# Patient Record
Sex: MALE | Race: BLACK OR AFRICAN AMERICAN | Employment: FULL TIME | URBAN - METROPOLITAN AREA
[De-identification: names, ages, dates, MRNs, and addresses within clinical notes are randomized per-mention and may not be internally consistent; named-entity substitution may affect disease eponyms.]

---

## 2021-09-22 ENCOUNTER — HOSPITAL ENCOUNTER (EMERGENCY)
Facility: CLINIC | Age: 41
Discharge: HOME | End: 2021-09-22
Attending: EMERGENCY MEDICINE | Admitting: EMERGENCY MEDICINE
Payer: COMMERCIAL

## 2021-09-22 VITALS
HEIGHT: 66 IN | SYSTOLIC BLOOD PRESSURE: 148 MMHG | HEART RATE: 66 BPM | RESPIRATION RATE: 16 BRPM | OXYGEN SATURATION: 99 % | TEMPERATURE: 97.4 F | WEIGHT: 198 LBS | BODY MASS INDEX: 31.82 KG/M2 | DIASTOLIC BLOOD PRESSURE: 104 MMHG

## 2021-09-22 DIAGNOSIS — M54.42 ACUTE LEFT-SIDED LOW BACK PAIN WITH LEFT-SIDED SCIATICA: ICD-10-CM

## 2021-09-22 PROCEDURE — 99284 EMERGENCY DEPT VISIT MOD MDM: CPT | Performed by: EMERGENCY MEDICINE

## 2021-09-22 RX ORDER — METHYLPREDNISOLONE 4 MG
TABLET, DOSE PACK ORAL
Qty: 21 TABLET | Refills: 0 | Status: SHIPPED | OUTPATIENT
Start: 2021-09-22

## 2021-09-22 RX ORDER — TIZANIDINE 2 MG/1
2-4 TABLET ORAL 3 TIMES DAILY PRN
Qty: 20 TABLET | Refills: 0 | Status: SHIPPED | OUTPATIENT
Start: 2021-09-22

## 2021-09-22 ASSESSMENT — MIFFLIN-ST. JEOR: SCORE: 1740.87

## 2021-09-22 NOTE — ED TRIAGE NOTES
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 Patient presents with concerns for L) lower back pain for the past few weeks. He reports slipping/ stepping off a pallet awkwardly a few weeks ago and had pain but gradually getting worse. He reports pain radiating down L) leg to calf. He missed work yesterday due to pain. Patient works in shipping and packing department. Sarah Rosado RN

## 2021-09-22 NOTE — DISCHARGE INSTRUCTIONS
As discussed I think most likely you have bulging disc pressing on the nerve causing the pain going on your leg.  I have referred you to spine surgery and they should call you with an appointment.  I also put a referral in for physical therapy.  You should not do any heavy lifting and rest until this is improved.  I hope you get better quickly.

## 2021-09-22 NOTE — LETTER
September 22, 2021      To Whom It May Concern:      ABDIAS Jon was seen in our Emergency Department today, 09/22/21 for an injury incurred at work about 3 weeks ago.  I expect his condition to improve over the next 14 days.  He may return to work when improved but must be on light duty until cleared by his doctor.     Sincerely,        Bereket Goetz MD

## 2021-09-22 NOTE — ED PROVIDER NOTES
"  History     Chief Complaint   Patient presents with     Work Comp     Back Injury     HPI  ABDIAS Jon is a 40 year old male who  presents the emergency department secondary to left low back pain.  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  The pain radiates along the side of the left leg and towards the lateral aspect of the left foot.  This is provoked by straight leg raise and bending at the waist or heavy lifting.  He has not had any numbness or tingling.  No incontinence of urine.  The pain is moderate to severe.  He has been working but any sort of bending or lifting seems to provoke the symptoms.  He has not been seen for this yet.  He does try Tylenol and ibuprofen with minimal relief.    Allergies:  No Known Allergies    Problem List:    There are no problems to display for this patient.       Past Medical History:    No past medical history on file.    Past Surgical History:    No past surgical history on file.    Family History:    No family history on file.    Social History:  Marital Status:   [2]  Social History     Tobacco Use     Smoking status: Not on file   Substance Use Topics     Alcohol use: Not on file     Drug use: Not on file        Medications:    insulin glargine (LANTUS SOLOSTAR) 100 UNIT/ML pen  methylPREDNISolone (MEDROL DOSEPAK) 4 MG tablet therapy pack  tiZANidine (ZANAFLEX) 2 MG tablet          Review of Systems   All other systems reviewed and are negative.      Physical Exam   BP: (!) 149/104 (R) lower forearm)  Pulse: 76  Temp: 97.4  F (36.3  C)  Resp: 16  Height: 167.6 cm (5' 6\")  Weight: 89.8 kg (198 lb)  SpO2: 98 %      Physical Exam  Vitals and nursing note reviewed.   Constitutional:       General: He is not in acute distress.     Appearance: He is well-developed. He is not diaphoretic. "   HENT:      Head: Normocephalic and atraumatic.      Nose: Nose normal.   Eyes:      General: No scleral icterus.  Cardiovascular:      Rate and Rhythm: Normal rate.   Pulmonary:      Effort: Pulmonary effort is normal.   Musculoskeletal:      Cervical back: Normal range of motion and neck supple.      Comments: Straight leg raise to 70 degrees on the left provokes radicular symptoms down the lateral aspect of the left leg.  Straight leg raise on the right does not provoke any symptoms.   Skin:     General: Skin is warm and dry.      Findings: No rash.   Neurological:      Mental Status: He is alert and oriented to person, place, and time.      Cranial Nerves: No cranial nerve deficit.      Motor: No weakness.   Psychiatric:         Mood and Affect: Mood normal.         ED Course        Procedures                  No results found for this or any previous visit (from the past 24 hour(s)).    Medications - No data to display    Assessments & Plan (with Medical Decision Making)  Lumbar radiculopathy suspected.  The patient was referred to spine surgery.  He likely will need an MRI.  He would prefer not to have any procedures done if he can help with.  He was referred to physical therapy.  He is given a Medrol Dosepak and tizanidine.  He declined narcotics.  Risks and follow-up discussed with a competent patient who agrees with the plan.     I have reviewed the nursing notes.    I have reviewed the findings, diagnosis, plan and need for follow up with the patient.      New Prescriptions    METHYLPREDNISOLONE (MEDROL DOSEPAK) 4 MG TABLET THERAPY PACK    Follow Package Directions    TIZANIDINE (ZANAFLEX) 2 MG TABLET    Take 1-2 tablets (2-4 mg) by mouth 3 times daily as needed for muscle spasms       Final diagnoses:   Acute left-sided low back pain with left-sided sciatica       9/22/2021   [facility] EMERGENCY DEPT     Bereket Goetz MD  09/22/21 [id number]

## 2021-09-23 ENCOUNTER — HOSPITAL ENCOUNTER (OUTPATIENT)
Dept: GENERAL RADIOLOGY | Facility: CLINIC | Age: 41
Discharge: HOME | End: 2021-09-23
Attending: NURSE PRACTITIONER | Admitting: NURSE PRACTITIONER
Payer: COMMERCIAL

## 2021-09-23 ENCOUNTER — OFFICE VISIT (OUTPATIENT)
Dept: NEUROSURGERY | Facility: CLINIC | Age: 41
End: 2021-09-23
Attending: EMERGENCY MEDICINE
Payer: COMMERCIAL

## 2021-09-23 VITALS
BODY MASS INDEX: 30.97 KG/M2 | DIASTOLIC BLOOD PRESSURE: 82 MMHG | HEIGHT: 66 IN | TEMPERATURE: 97.7 F | WEIGHT: 192.7 LBS | SYSTOLIC BLOOD PRESSURE: 128 MMHG

## 2021-09-23 DIAGNOSIS — M54.42 ACUTE LEFT-SIDED LOW BACK PAIN WITH LEFT-SIDED SCIATICA: ICD-10-CM

## 2021-09-23 PROCEDURE — 99203 OFFICE O/P NEW LOW 30 MIN: CPT | Performed by: NURSE PRACTITIONER

## 2021-09-23 PROCEDURE — 72100 X-RAY EXAM L-S SPINE 2/3 VWS: CPT

## 2021-09-23 ASSESSMENT — MIFFLIN-ST. JEOR: SCORE: 1716.83

## 2021-09-23 NOTE — PROGRESS NOTES
"ABDIAS Jon is a 40 year old male who presents for:  Chief Complaint   Patient presents with     Neurologic Problem     Acute Left LBP with Left sciatica        Initial Vitals:  /82   Temp 97.7  F (36.5  C) (Temporal)   Ht 5' 6\" (1.676 m)   Wt 192 lb 11.2 oz (87.4 kg)   BMI 31.10 kg/m   Estimated body mass index is 31.1 kg/m  as calculated from the following:    Height as of this encounter: 5' 6\" (1.676 m).    Weight as of this encounter: 192 lb 11.2 oz (87.4 kg).. Body surface area is 2.02 meters squared. BP completed using cuff size: regular  Data Unavailable    Nursing Comments:     Ny Madsen    "

## 2021-09-23 NOTE — PATIENT INSTRUCTIONS
-Lumbar xray today. Please complete. I will contact you if the results are abnormal.  -Physical therapy ordered. They will contact you to schedule.  -Please contact our clinic with questions or concerns at [phone].

## 2021-09-23 NOTE — LETTER
"    9/23/2021         RE: ABDIAS Jon  [address]  [facility] [address]        Dear Colleague,    Thank you for referring your patient, ABDIAS Jon, to the [facility] NEUROSURGERY [facility]INIC [place]. Please see a copy of my visit note below.    ABDIAS Jon is a 40 year old male who presents for:  Chief Complaint   Patient presents with     Neurologic Problem     Acute Left LBP with Left sciatica        Initial Vitals:  /82   Temp 97.7  F (36.5  C) (Temporal)   Ht 5' 6\" (1.676 m)   Wt 192 lb 11.2 oz (87.4 kg)   BMI 31.10 kg/m   Estimated body mass index is 31.1 kg/m  as calculated from the following:    Height as of this encounter: 5' 6\" (1.676 m).    Weight as of this encounter: 192 lb 11.2 oz (87.4 kg).. Body surface area is 2.02 meters squared. BP completed using cuff size: regular  Data Unavailable    Nursing Comments:     Ny Madsen      [facility] Neurosurgery  Neurosurgery Clinic Visit      CC: low back and leg pain    Primary care Provider: Clinic, [facility]    Reason For Visit:   I was asked by Dr. Goetz to consult on the patient for acute left-sided low back pain with left-sided sciatica.    HPI: ABDIAS Jon is a 40 year old male with a 3.5 week history of low back and left leg pain who presents for worsening symptoms. He states he stepped off a ledge and landed incorrectly while at work. He immediately had low back pain which progressed to left-sided low back pain that would radiate to the left posterior thigh to the calf. He reports intermittent numbness in the bilateral hips as well.  He denies overt weakness. No changes to bowel or bladder. He states symptoms are worse with inactivity. He states he is not interested in invasive procedures at this time. He states he has had benefit from PT for his shoulder in the past and would like to start therapy.    No past medical history on file.    Past Medical History reviewed with patient during visit.    No past surgical " "history on file.  Past Surgical History reviewed with patient during visit.    Current Outpatient Medications   Medication     insulin glargine (LANTUS SOLOSTAR) 100 UNIT/ML pen     methylPREDNISolone (MEDROL DOSEPAK) 4 MG tablet therapy pack     tiZANidine (ZANAFLEX) 2 MG tablet     No current facility-administered medications for this visit.       No Known Allergies    Social History     Socioeconomic History     Marital status:      Spouse name: None     Number of children: None     Years of education: None     Highest education level: None   Occupational History     None   Tobacco Use     Smoking status: Never Smoker     Smokeless tobacco: Never Used   Substance and Sexual Activity     Alcohol use: None     Drug use: None     Sexual activity: None   Other Topics Concern     None   Social History Narrative     None     Social Determinants of Health     Financial Resource Strain:      Difficulty of Paying Living Expenses:    Food Insecurity:      Worried About Running Out of Food in the Last Year:      Ran Out of Food in the Last Year:    Transportation Needs:      Lack of Transportation (Medical):      Lack of Transportation (Non-Medical):    Physical Activity:      Days of Exercise per Week:      Minutes of Exercise per Session:    Stress:      Feeling of Stress :    Social Connections:      Frequency of Communication with Friends and Family:      Frequency of Social Gatherings with Friends and Family:      Attends Nondenominational Services:      Active Member of Clubs or Organizations:      Attends Club or Organization Meetings:      Marital Status:    Intimate Partner Violence:      Fear of Current or Ex-Partner:      Emotionally Abused:      Physically Abused:      Sexually Abused:        No family history on file.      ROS: 10 point ROS neg other than the symptoms noted above in the HPI.    Vital Signs:   /82   Temp 97.7  F (36.5  C) (Temporal)   Ht 5' 6\" (1.676 m)   Wt 192 lb 11.2 oz (87.4 kg)   " BMI 31.10 kg/m        Examination:  Constitutional:  Alert, well nourished, NAD.  Memory: recent and remote memory   HEENT: Normocephalic, atraumatic.   Pulm:  Without shortness of breath   CV:  No pitting edema of BLE.      Neurological:  Awake  Alert  Oriented x 3  Speech clear  Tongue midline    Motor exam:  Hip Flexor:                Right: 5/5  Left:  5/5  Hip Adductor:             Right:  5/5  Left:  5/5  Hip Abductor:             Right:  5/5  Left:  5/5  Gastroc Soleus:        Right:  5/5  Left:  5/5  Tib/Ant:                      Right:  5/5  Left:  5/5  EHL:                          Right:  5/5  Left:  5/5     Sensation normal to bilateral upper and lower extremities  Muscle tone to bilateral upper and lower extremities   Gait: Able to stand from a seated position. Normal non-antalgic, non-myelopathic gait.  Able to heel/toe walk without loss of balance    Lumbar examination reveals no tenderness of the spine or paraspinous muscles.  Hip height is symmetrical. Negative SI joint, sciatic notch or greater trochanteric tenderness to palpation bilaterally.  Straight leg raise is negative bilaterally.      Imaging: None    Assessment/Plan:   Acute low back pain with left-sided sciatica. Discussed options of XR/PT and MRI. He would like to start with XR/PT at this time as he is not interested in surgery and/or injections at this time. Red flag symptoms discussed. He will obtain the XR today. I will contact him if the results are abnormal. He will contact the clinic with new or worsening symptoms. He verbalized understanding and agreement.    Patient Instructions   -Lumbar xray today. Please complete. I will contact you if the results are abnormal.  -Physical therapy ordered. They will contact you to schedule.  -Please contact our clinic with questions or concerns at [phone].      Daysi Lanza, [facility] Neurosurgery  [address] [place] [address]  [address] [place] [address]  Tel  [phone]  Fax [phone]        Again, thank you for allowing me to participate in the care of your patient.        Sincerely,        Daysi Lanza NP

## 2021-09-23 NOTE — PROGRESS NOTES
[facility] Neurosurgery  Neurosurgery Clinic Visit      CC: low back and leg pain    Primary care Provider: Clinic, [facility]    Reason For Visit:   I was asked by Dr. Goetz to consult on the patient for acute left-sided low back pain with left-sided sciatica.    HPI: ABDIAS Jon is a 40 year old male with a 3.5 week history of low back and left leg pain who presents for worsening symptoms. He states he stepped off a ledge and landed incorrectly while at work. He immediately had low back pain which progressed to left-sided low back pain that would radiate to the left posterior thigh to the calf. He reports intermittent numbness in the bilateral hips as well.  He denies overt weakness. No changes to bowel or bladder. He states symptoms are worse with inactivity. He states he is not interested in invasive procedures at this time. He states he has had benefit from PT for his shoulder in the past and would like to start therapy.    No past medical history on file.    Past Medical History reviewed with patient during visit.    No past surgical history on file.  Past Surgical History reviewed with patient during visit.    Current Outpatient Medications   Medication     insulin glargine (LANTUS SOLOSTAR) 100 UNIT/ML pen     methylPREDNISolone (MEDROL DOSEPAK) 4 MG tablet therapy pack     tiZANidine (ZANAFLEX) 2 MG tablet     No current facility-administered medications for this visit.       No Known Allergies    Social History     Socioeconomic History     Marital status:      Spouse name: None     Number of children: None     Years of education: None     Highest education level: None   Occupational History     None   Tobacco Use     Smoking status: Never Smoker     Smokeless tobacco: Never Used   Substance and Sexual Activity     Alcohol use: None     Drug use: None     Sexual activity: None   Other Topics Concern     None   Social History Narrative     None     Social Determinants of Health  "    Financial Resource Strain:      Difficulty of Paying Living Expenses:    Food Insecurity:      Worried About Running Out of Food in the Last Year:      Ran Out of Food in the Last Year:    Transportation Needs:      Lack of Transportation (Medical):      Lack of Transportation (Non-Medical):    Physical Activity:      Days of Exercise per Week:      Minutes of Exercise per Session:    Stress:      Feeling of Stress :    Social Connections:      Frequency of Communication with Friends and Family:      Frequency of Social Gatherings with Friends and Family:      Attends Pentec[employer]al Services:      Active Member of Clubs or Organizations:      Attends Club or Organization Meetings:      Marital Status:    Intimate Partner Violence:      Fear of Current or Ex-Partner:      Emotionally Abused:      Physically Abused:      Sexually Abused:        No family history on file.      ROS: 10 point ROS neg other than the symptoms noted above in the HPI.    Vital Signs:   /82   Temp 97.7  F (36.5  C) (Temporal)   Ht 5' 6\" (1.676 m)   Wt 192 lb 11.2 oz (87.4 kg)   BMI 31.10 kg/m        Examination:  Constitutional:  Alert, well nourished, NAD.  Memory: recent and remote memory   HEENT: Normocephalic, atraumatic.   Pulm:  Without shortness of breath   CV:  No pitting edema of BLE.      Neurological:  Awake  Alert  Oriented x 3  Speech clear  Tongue midline    Motor exam:  Hip Flexor:                Right: 5/5  Left:  5/5  Hip Adductor:             Right:  5/5  Left:  5/5  Hip Abductor:             Right:  5/5  Left:  5/5  Gastroc Soleus:        Right:  5/5  Left:  5/5  Tib/Ant:                      Right:  5/5  Left:  5/5  EHL:                          Right:  5/5  Left:  5/5     Sensation normal to bilateral upper and lower extremities  Muscle tone to bilateral upper and lower extremities   Gait: Able to stand from a seated position. Normal non-antalgic, non-myelopathic gait.  Able to heel/toe walk without loss of " balance    Lumbar examination reveals no tenderness of the spine or paraspinous muscles.  Hip height is symmetrical. Negative SI joint, sciatic notch or greater trochanteric tenderness to palpation bilaterally.  Straight leg raise is negative bilaterally.      Imaging: None    Assessment/Plan:   Acute low back pain with left-sided sciatica. Discussed options of XR/PT and MRI. He would like to start with XR/PT at this time as he is not interested in surgery and/or injections at this time. Red flag symptoms discussed. He will obtain the XR today. I will contact him if the results are abnormal. He will contact the clinic with new or worsening symptoms. He verbalized understanding and agreement.    Patient Instructions   -Lumbar xray today. Please complete. I will contact you if the results are abnormal.  -Physical therapy ordered. They will contact you to schedule.  -Please contact our clinic with questions or concerns at [phone].      Daysi Lanza, [facility] Neurosurgery  [address] [place] [id number]  Tel [phone]  Fax [phone]

## 2021-09-29 ENCOUNTER — HOSPITAL ENCOUNTER (OUTPATIENT)
Dept: PHYSICAL THERAPY | Facility: CLINIC | Age: 41
Setting detail: THERAPIES SERIES
End: 2021-09-29
Attending: NURSE PRACTITIONER
Payer: COMMERCIAL

## 2021-09-29 DIAGNOSIS — M54.42 ACUTE LEFT-SIDED LOW BACK PAIN WITH LEFT-SIDED SCIATICA: ICD-10-CM

## 2021-09-29 PROCEDURE — 97014 ELECTRIC STIMULATION THERAPY: CPT | Mod: GP | Performed by: PHYSICAL THERAPIST

## 2021-09-29 PROCEDURE — 97162 PT EVAL MOD COMPLEX 30 MIN: CPT | Mod: GP | Performed by: PHYSICAL THERAPIST

## 2021-09-29 PROCEDURE — 97110 THERAPEUTIC EXERCISES: CPT | Mod: GP | Performed by: PHYSICAL THERAPIST

## 2021-09-30 NOTE — PROGRESS NOTES
[facility]      OUTPATIENT PHYSICAL THERAPY ORTHOPEDIC EVALUATION  PLAN OF TREATMENT FOR OUTPATIENT REHABILITATION  (COMPLETE FOR INITIAL CLAIMS ONLY)  Patient's Last Name, First Name, ABDIAS Rutledge    Provider s Name:  [facility]   Medical Record No.  [id number]   Start of Care Date:  09/29/21   Onset Date:  09/22/21 (Sought care in ER, incident apprx 4 weeks ago)   Type:     _X__PT   ___OT   ___SLP Medical Diagnosis:  LBP with radiculopathy     PT Diagnosis:  Acute LBP with radiculopathy, segmental joint dysfunction, reduced functional mobility   Visits from SOC:  1   Therapist assessment:   _________________________________________________________________________________  Plan of Treatment/Functional Goals:  joint mobilization, ROM, strengthening, stretching, neuromuscular re-education, manual therapy           Goals  Goal Identifier: FREDDY  Goal Description: Patient will reduce back related disabilty from 38% to <10% to return to work without restrictions in 8 weeks  Target Date: 11/24/21    Goal Identifier: Pain   Goal Description: Patient will note no radicular LE symptoms in L LE x 1 week to demonstrate centralzation for disc healing.  Target Date: 10/27/21    Goal Identifier: Activity tolerance  Goal Description: Patient will report ability to stand, reposition, cook light meal for 15-20 minutes, walk at grocery store with pain < 3/10 and no leg pain  Target Date: 11/17/21            Therapy Frequency:  2 times/Week  Predicted Duration of Therapy Intervention:  8 weeks    Laverne Owens, PT                 I CERTIFY THE NEED FOR THESE SERVICES FURNISHED UNDER        THIS PLAN OF TREATMENT AND WHILE UNDER MY CARE     (Physician co-signature of this document indicates review and certification of the therapy plan).                        Certification Date From:  09/29/21   Certification Date To:  12/01/21    Referring Provider:  Daysi Chase  Assessment        See Epic Evaluation Start of Care Date: 09/29/21             Laverne Marie................... PT, DPT, CLT   9/29/2021, 7:35 PM  [phone]

## 2021-09-30 NOTE — PROGRESS NOTES
09/29/21 1700   General Information   Type of Visit Initial OP Ortho PT Evaluation   Start of Care Date 09/29/21   Referring Physician Daysi Lanza   Patient/Family Goals Statement Get better   Orders Per Therapist Evaluation   Date of Order 09/23/21   Certification Required? Yes   Medical Diagnosis Acute left-sided low back pain with left-sided sciatica M54.42    Precautions/Limitations other (see comments)   Weight-Bearing Status - LUE weight-bearing as tolerated   Weight-Bearing Status - RUE weight-bearing as tolerated   Weight-Bearing Status - LLE weight-bearing as tolerated   Weight-Bearing Status - RLE weight-bearing as tolerated   General Information Comments Just got work restritions today for no lifting > 10#, no    Body Part(s)   Body Part(s) Lumbar Spine/SI   Presentation and Etiology   Pertinent history of current problem (include personal factors and/or comorbidities that impact the POC) Chief complaint: L low back and leg pain that began  A few week ago at work. .  He states he stepped off a ledge and landed incorrectly while at work. He landed on both feet but felt twisted; about 3 feet off the platform. Stated immediate strain; He immediately had low back pain which progressed to left-sided low back pain that would radiate to the left posterior thigh to the calf. He sought cares in ER on 9/22 when he missed work d/t pain. He didn't seek care when it happened as he was just trying to work it out and that it would get better on its own. It has not. He has no history of low back pain. Has remained unchanged since that day; S.O reports he is moving more slowing and guardedly over time; so she things things are worsening. . Sitting is better than walking but all better than standing in one place.  He reports intermittent numbness in the bilateral hips as well.  He denies overt weakness. No changes to bowel or bladder. He states symptoms are worse with inactivity. States quick movements, or  "reaching.  Patient is self reporting their current level of function to be 10% compared to their baseline; limited.  Prior interventions include none.  States staying still helps. Used to be a stomach sleeper but cannot lay prone.  Patient takes Tylenol and Aleve medication for this on a PRN basis, Got steroids and mm relaxers and they still .  Patient is employed at [employer] full time, currently on a 10# weight restriction and no lifting/twisting so is currently off work.  Work comp claim has been opened. Pain is 5/10 at best, 10/10 worst, and 8/10 currently; size of melon on L low back, no radicular symptoms currently. Usually shoots down with movement of any sort.  Can radiate to backside of calf. No formal exercise program at baseline. Goal for PT: Alleviate back pain and back to work; \"be better\".    Impairments B. Decreased WB tolerance;D. Decreased ROM;E. Decreased flexibility;F. Decreased strength and endurance;A. Pain   Functional Limitations perform activities of daily living;perform desired leisure / sports activities;perform required work activities   Symptom Location L low back, radicular symptoms intermittent to L distal calf   How/Where did it occur With a fall;At work   Onset date of current episode/exacerbation 09/22/21  (Sought care in ER, incident apprx 4 weeks ago)   Chronicity New   Pain quality A. Sharp;B. Dull;C. Aching   Progression of symptoms since onset: Worsened   Current / Previous Interventions   Diagnostic Tests: X-ray   X-ray Results Results   X-ray results Slight disc height reduction L1, no fractures noted   Prior Level of Function   Prior Level of Function-Mobility Independent   Functional Level Prior Comment Independent, no hx of back pain, works at [employer]   Current Level of Function   Patient role/employment history A. Employed   Living environment House/townhome   Home/community accessibility Has 1 and 2 year old, S.O appears to be pregnant currently    Current " equipment-Gait/Locomotion None   Fall Risk Screen   Fall screen completed by PT   Have you fallen 2 or more times in the past year? No   Have you fallen and had an injury in the past year? Yes   Is patient a fall risk? No   Fall screen comments Patient came down off an elevated surface at work awkwardly, no obvious weakness in LEs but ROM in LEs limited by pain in back.    Abuse Screen (yes response referral indicated)   Feels Unsafe at Home or Work/School no   Feels Threatened by Someone no   Does Anyone Try to Keep You From Having Contact with Others or Doing Things Outside Your Home? no   Physical Signs of Abuse Present no   System Outcome Measures   Outcome Measures Low Back Pain (see Oswestry and Fran)   Functional Scales   Functional Scales Other   Other Scales  Fran 6 sub 3, FREDDY 28%   Lumbar Spine/SI Objective Findings   Observation Patient appears in pain, slow transitional movements , somewhat guarded movement patterns   Integumentary No concerns   Posture Lays with L leg less IR than resting R ER at natural position   Gait/Locomotion Patient is unable to stand fully erect and places L leg ahead of R in stance, and with reduced stride length L>R. slightly slowed gait speed   Balance/Proprioception (Single Leg Stance) NT   Flexion ROM % limited   Extension ROM Limited to just about neutral, pain worse   Right Side Bending ROM Max LTM   Left Side Bending ROM 75% limited   Lumbar ROM Comment Repeated movements: Stating pain in standing 6/10 size of melon in L low back. Standing extension increases distal symptom to IT region 8/10 pain, Flexion also very limited and can flex to fingertips at mid thigh, Increased L distal symptom to Superior upper thigh posteriorly. No tolerance to R SB max limited and increased L sided LBP and radiating to superior glute but more dull pain. Prone 8/10 low back pain and cannot extend well tends to ER and keep L hip flexed. Hookyling 7.5/10 L LBP. Cannot tolerate SKC >90  deg and L SKC > 60 deg. Even hooklie is hard for him. Prone over 2 pillows reduces pain to 5/10 in L low back size of grapefruit * best position. Prone over 1 increases size and to 7./10.    Transversus Abdominus Strength (Enoch Leg Lowering-deg) Fair ab set awareness but holds breath   Hip Flexion (L2) Strength cannot test, too much pain with ROM flexion of either hip   Lumbar/Hip/Knee/Foot Strength Comments NO obvious strength asymmetry, strength >3+/5 in major mm groups observed through movement   Hamstring Flexibility LImited d/t pain   Hip Flexor Flexibility Limited R>L d/t pain, tender at palpation iliacus and iliopsoas L>R   Piriformis Flexibility Tightness L>R via palpation. Poor tolerance to hip ROM > 60 flexion and small range ER/IR.    Lumbar/SI Special Tests Comments Testing limited by pain with ROM testing   Segmental Mobility NT   Sensation Testing No parathesias reported, LE pain reported   Palpation Appears to have mid lumbar segmental L rotations. Paraspinals more in spasm on L side to TL junction. L glutes and piriformis more taut than R side. + increased pain on L with R, L SI shear test and L is more hypomobile   Planned Therapy Interventions   Planned Therapy Interventions joint mobilization;ROM;strengthening;stretching;neuromuscular re-education;manual therapy   Planned Modality Interventions   Planned Modality Interventions Electrical stimulation;Cryotherapy;Ultrasound   Clinical Impression   Criteria for Skilled Therapeutic Interventions Met yes, treatment indicated   PT Diagnosis Acute LBP with radiculopathy, segmental joint dysfunction, reduced functional mobility   Influenced by the following impairments ROM,palpation, Force analysis ,    Functional limitations due to impairments Most anything active , work, home tasks,  and housework tasks   Clinical Presentation Evolving/Changing   Clinical Presentation Rationale PLOF vs current, radicular past knee, motivated to improve and  engaged to participate in PT   Clinical Decision Making (Complexity) Moderate complexity   Therapy Frequency 2 times/Week   Predicted Duration of Therapy Intervention (days/wks) 8 weeks   Risk & Benefits of therapy have been explained Yes   Patient, Family & other staff in agreement with plan of care Yes   Clinical Impression Comments Appears to have acute onset LBP with radicular symptoms which seem disogenic. Prone over 2 pillows demos centralization with quite quick return to increased pain upon rising. MOdalities , education ,positional strategies as first course of care followed by progression as able. Patient has already been seen in spine clinic and by PCP today who provided work restrictions. Open but not approved WC case pending.    Education Assessment   Preferred Learning Style Listening;Reading;Demonstration;Pictures/video   Barriers to Learning No barriers   ORTHO GOALS   PT Ortho Eval Goals 1;2;3;4   Ortho Goal 1   Goal Identifier FREDDY   Goal Description Patient will reduce back related disabilty from 38% to <10% to return to work without restrictions in 8 weeks   Target Date 11/24/21   Ortho Goal 2   Goal Identifier Pain    Goal Description Patient will note no radicular LE symptoms in L LE x 1 week to demonstrate centralzation for disc healing.   Target Date 10/27/21   Ortho Goal 3   Goal Identifier Activity tolerance   Goal Description Patient will report ability to stand, reposition, cook light meal for 15-20 minutes, walk at grocery store with pain < 3/10 and no leg pain   Target Date 11/17/21   Total Evaluation Time   PT Trae Moderate Complexity Minutes (39030) 20   Therapy Certification   Certification date from 09/29/21   Certification date to 12/01/21   Medical Diagnosis LBP with radiculopathy

## 2021-10-04 ENCOUNTER — HOSPITAL ENCOUNTER (OUTPATIENT)
Dept: PHYSICAL THERAPY | Facility: CLINIC | Age: 41
Setting detail: THERAPIES SERIES
End: 2021-10-04
Attending: NURSE PRACTITIONER
Payer: OTHER MISCELLANEOUS

## 2021-10-04 PROCEDURE — 97014 ELECTRIC STIMULATION THERAPY: CPT | Mod: GP | Performed by: PHYSICAL THERAPIST

## 2021-10-04 PROCEDURE — 97530 THERAPEUTIC ACTIVITIES: CPT | Mod: GP | Performed by: PHYSICAL THERAPIST

## 2021-10-04 PROCEDURE — 97110 THERAPEUTIC EXERCISES: CPT | Mod: GP | Performed by: PHYSICAL THERAPIST

## 2021-10-04 PROCEDURE — 97140 MANUAL THERAPY 1/> REGIONS: CPT | Mod: GP | Performed by: PHYSICAL THERAPIST

## 2021-10-06 ENCOUNTER — IMMUNIZATION (OUTPATIENT)
Dept: FAMILY MEDICINE | Facility: CLINIC | Age: 41
End: 2021-10-06
Payer: COMMERCIAL

## 2021-10-06 DIAGNOSIS — Z23 NEED FOR PROPHYLACTIC VACCINATION AND INOCULATION AGAINST INFLUENZA: Primary | ICD-10-CM

## 2021-10-06 PROCEDURE — 99207 PR NO CHARGE NURSE ONLY: CPT

## 2021-10-06 PROCEDURE — 90471 IMMUNIZATION ADMIN: CPT

## 2021-10-06 PROCEDURE — 90686 IIV4 VACC NO PRSV 0.5 ML IM: CPT

## 2021-10-07 ENCOUNTER — HOSPITAL ENCOUNTER (OUTPATIENT)
Dept: PHYSICAL THERAPY | Facility: CLINIC | Age: 41
Setting detail: THERAPIES SERIES
End: 2021-10-07
Attending: NURSE PRACTITIONER
Payer: OTHER MISCELLANEOUS

## 2021-10-07 PROCEDURE — 97110 THERAPEUTIC EXERCISES: CPT | Mod: GP

## 2021-10-07 PROCEDURE — 97014 ELECTRIC STIMULATION THERAPY: CPT | Mod: GP

## 2021-10-07 PROCEDURE — 97010 HOT OR COLD PACKS THERAPY: CPT | Mod: GP

## 2021-10-07 PROCEDURE — 97140 MANUAL THERAPY 1/> REGIONS: CPT | Mod: GP

## 2021-10-12 ENCOUNTER — HOSPITAL ENCOUNTER (OUTPATIENT)
Dept: PHYSICAL THERAPY | Facility: CLINIC | Age: 41
Setting detail: THERAPIES SERIES
End: 2021-10-12
Attending: NURSE PRACTITIONER
Payer: OTHER MISCELLANEOUS

## 2021-10-12 PROCEDURE — 97010 HOT OR COLD PACKS THERAPY: CPT | Mod: GP

## 2021-10-12 PROCEDURE — 97110 THERAPEUTIC EXERCISES: CPT | Mod: GP

## 2021-10-12 PROCEDURE — 97014 ELECTRIC STIMULATION THERAPY: CPT | Mod: GP

## 2021-10-12 PROCEDURE — 97140 MANUAL THERAPY 1/> REGIONS: CPT | Mod: GP

## 2021-10-15 ENCOUNTER — HOSPITAL ENCOUNTER (OUTPATIENT)
Dept: PHYSICAL THERAPY | Facility: CLINIC | Age: 41
Setting detail: THERAPIES SERIES
End: 2021-10-15
Attending: NURSE PRACTITIONER
Payer: OTHER MISCELLANEOUS

## 2021-10-15 PROCEDURE — 97014 ELECTRIC STIMULATION THERAPY: CPT | Mod: GP

## 2021-10-15 PROCEDURE — 97110 THERAPEUTIC EXERCISES: CPT | Mod: GP

## 2021-10-15 PROCEDURE — 97010 HOT OR COLD PACKS THERAPY: CPT | Mod: GP

## 2021-10-17 ENCOUNTER — HEALTH MAINTENANCE LETTER (OUTPATIENT)
Age: 41
End: 2021-10-17

## 2021-10-18 ENCOUNTER — HOSPITAL ENCOUNTER (OUTPATIENT)
Dept: PHYSICAL THERAPY | Facility: CLINIC | Age: 41
Setting detail: THERAPIES SERIES
End: 2021-10-18
Attending: NURSE PRACTITIONER
Payer: OTHER MISCELLANEOUS

## 2021-10-18 PROCEDURE — 97110 THERAPEUTIC EXERCISES: CPT | Mod: GP | Performed by: PHYSICAL THERAPIST

## 2021-10-18 PROCEDURE — 97140 MANUAL THERAPY 1/> REGIONS: CPT | Mod: GP | Performed by: PHYSICAL THERAPIST

## 2021-10-18 PROCEDURE — 97014 ELECTRIC STIMULATION THERAPY: CPT | Mod: GP | Performed by: PHYSICAL THERAPIST

## 2021-10-18 NOTE — PROGRESS NOTES
Outpatient Physical Therapy Progress Note     Patient: ABDIAS Jon  : 1980    Beginning/End Dates of Reporting Period:  2021 to 10/18/2021    Referring Provider: Daysi Lanza (Spine clinic), Dr Cabrales PCP    Therapy Diagnosis: Low back pain, lumbar radiculopathy; reduced ROM, joint mobility, activity tolerance    Client Self Report: MD visit on Wednesday 10/20; work wants updated work restrictions. They do not have light duty for him at this point still. NO leg pain x 1.5 weeks. Back pain is starting to less intense all way; some times of very low levels of pain but flared up with activities still like High stepping over gate or prolonged standing; can sleep better overall.  60% better overall since onset of symptoms, but not yet tolerating higher level activities. He remains compliant with PT recommendations and has limited his lifting and bending as I suggested    Objective Measurements:  Objective Measure: PREP  Details: Continues to do 1-2 pillows based on response. No leg pain x 1.5 weeks, localized to his low back now.   Objective Measure: Pain  Details: 4/10, L lumbar size of orange. No L LE pain x 1.5 weeks now  Objective Measure: Palpation  Details: TIghtness paraspinals on L with TL junction L rotated with rib tension T10-12, L rotated T7, Psoas tension L>R .         Goals:  Goal Identifier FREDDY   Goal Description Patient will reduce back related disabilty from 38% to <10% to return to work without restrictions in 8 weeks   Target Date 21   Date Met      Progress (detail required for progress note):  Not completed today but improving overall     Goal Identifier Pain    Goal Description Patient will note no radicular LE symptoms in L LE x 1 week to demonstrate centralzation for disc healing.   Target Date 10/27/21   Date Met  10/18/21   Progress (detail required for progress note):       Goal Identifier Activity tolerance   Goal Description Patient will report ability to  stand, reposition, cook light meal for 15-20 minutes, walk at grocery store with pain < 3/10 and no leg pain   Target Date 11/17/21   Date Met      Progress (detail required for progress note): Improving but pain remains from 1-6 in low back with ADLs and currently not working       Patient has attended 6 visits in this reporting period. Treatment has included therapeutic exercise for Lumbar positioning/centralization, ROM, Manual therapy to address tightness and reduced joint mobility affecting posture and movement tolerance, modalities to reduce inflammation and pain, and patient education. Still limited trunk and L hip extension but improving in postural elongation and eliminated his leg pain for 1.5 weeks. Still recommend patient avoid bending, lifting, twisting as much as possible, limited carrying and close to body; limited prolonged standing, able to reposition into his prone lying position every 2 hours. Will meet with MD this week for updated work restrictions,and light duty remains recommended by me in light of his current limitations though improving. Patient reports  progress overall in symptoms reduction and is making progress towards goals as outlined above  Plan:  Continue therapy per current plan of care.  Changes include     Discharge:  N         10/18/21 1000   Signing Clinician's Name / Credentials   Signing clinician's name / credentials Laverne Owens, PT, DPT   Session Number   Session Number 6   Authorization status Request for 12 visits submitted to  insurance basket.    Progress Note/Recertification   Progress Note Due Date 11/29/21   Progress Note Completed Date 10/18/21   Adult Goals   PT Ortho Eval Goals 1;2;3;4   Ortho Goal 1   Goal Identifier FREDDY   Goal Description Patient will reduce back related disabilty from 38% to <10% to return to work without restrictions in 8 weeks   Target Date 11/24/21   Ortho Goal 2   Goal Identifier Pain    Goal Description Patient will note no radicular LE  symptoms in L LE x 1 week to demonstrate centralzation for disc healing.   Target Date 10/27/21   Date Met 10/18/21   Ortho Goal 3   Goal Identifier Activity tolerance   Goal Description Patient will report ability to stand, reposition, cook light meal for 15-20 minutes, walk at grocery store with pain < 3/10 and no leg pain   Goal Progress Improving but pain remains from 1-6 in low back with ADLs and currently not working   Target Date 11/17/21   Subjective Report   Subjective Report MD visit on Wednesday 10/20; work wants updated work restrictions. They do not have light duty for him at this point still. NO leg pain x 1.5 weeks. Back pain is starting to less intense all way; some times of very low levels; High stepping over gate; can sleep better overall.  60% better overall since onset of symptoms, but not yet tolerating higher level activities.    Objective Measure 1   Objective Measure PREP   Details Continues to do 1-2 pillows based on response.    Objective Measure 2   Objective Measure Pain   Details 4/10, L lumbar size of orange. No L LE pain x 1.5 weeks now   Objective Measure 3   Objective Measure Palpation   Details TIghtness paraspinals on L with TL junction L rotated with rib tension T10-12, L rotated T7, Psoas tension L>R .   Cryotherapy   Cryotherapy Minutes (64793) 20   Skilled Intervention Cryotherapy for pain mgmt   Patient Response Tolerated well   Treatment Detail Applied cold pack over lumbar spine with IFC   Electrical Stimulation   Electrical Stimulation (Unattended) Minutes (93197) 20   Skilled Intervention Application of ESTM to reduce mm spasms, tissue edema and inflammation to promote progression of function and exercise tolerance,reduced pain   Patient Response Patient tolerates well with    Treatment Detail IFC to lumbar spine. Pt. positioned in prone over 1 pillows.  High, ICF, target to tolerance ~14   Therapeutic Procedure/exercise   Therapeutic Procedures: strength, endurance, ROM,  flexibillity minutes (15951) 10   Skilled Intervention Updated to add in quad/L hip flexor lengthening, continue to focus on lumbar centralization   Patient Response Patietn tolerates PROM quad stretch, AROM causes more back compensation d/t tension so will have his wife help him   Treatment Detail States did ok with  previous ex. WIll continue to work positional HEP to prone over 1 pillow.    Manual Therapy   Manual Therapy: Mobilization, MFR, MLD, friction massage minutes (48713) 25   Skilled Intervention STM/MT to reduce mm tension and improve tolerance to position change, posture and activities   Patient Response L TL junction rotation with rib tension L10-12 and poor opening L 11/12. T5 also L rotated. Elongation of anterior diaphragm region on L helpful to posture   Treatment Detail Prone Tx to tight regions of Thoracic and lumbar spine   Progress Still limited trunk adn L hip extension but improving in postural elongation and eliminated his leg pain for 1.5 weeks. Still recommend patient avoid bending, lifting, twisting as much as possible, limited carrying and close to body; limited prolonged standing, able to reposition into his prone lying position every 2 hours. Will meet with MD this week for updated work restrictions   Communication with other professionals   Communication with other professionals Note sent to Dr Quiroz   Plan   Home program PREP, L quad stretch, body mechanics   Plan for next session ESTM, MT, progress exercise if able and not worsening symptoms   Total Session Time   Timed Code Treatment Minutes 55   Total Treatment Time (sum of timed and untimed services) 55

## 2021-10-25 ENCOUNTER — HOSPITAL ENCOUNTER (OUTPATIENT)
Dept: PHYSICAL THERAPY | Facility: CLINIC | Age: 41
Setting detail: THERAPIES SERIES
End: 2021-10-25
Attending: NURSE PRACTITIONER
Payer: OTHER MISCELLANEOUS

## 2021-10-25 PROCEDURE — 97014 ELECTRIC STIMULATION THERAPY: CPT | Mod: GP | Performed by: PHYSICAL THERAPIST

## 2021-10-25 PROCEDURE — 97140 MANUAL THERAPY 1/> REGIONS: CPT | Mod: GP | Performed by: PHYSICAL THERAPIST

## 2021-10-25 PROCEDURE — 97110 THERAPEUTIC EXERCISES: CPT | Mod: GP | Performed by: PHYSICAL THERAPIST

## 2021-11-02 ENCOUNTER — HOSPITAL ENCOUNTER (OUTPATIENT)
Dept: PHYSICAL THERAPY | Facility: CLINIC | Age: 41
Setting detail: THERAPIES SERIES
End: 2021-11-02
Attending: NURSE PRACTITIONER
Payer: OTHER MISCELLANEOUS

## 2021-11-02 PROCEDURE — 97014 ELECTRIC STIMULATION THERAPY: CPT | Mod: GP

## 2021-11-02 PROCEDURE — 97140 MANUAL THERAPY 1/> REGIONS: CPT | Mod: GP

## 2021-11-02 PROCEDURE — 97110 THERAPEUTIC EXERCISES: CPT | Mod: GP

## 2021-11-08 ENCOUNTER — HOSPITAL ENCOUNTER (OUTPATIENT)
Dept: PHYSICAL THERAPY | Facility: CLINIC | Age: 41
Setting detail: THERAPIES SERIES
End: 2021-11-08
Attending: NURSE PRACTITIONER
Payer: OTHER MISCELLANEOUS

## 2021-11-08 PROCEDURE — 97110 THERAPEUTIC EXERCISES: CPT | Mod: GP | Performed by: PHYSICAL THERAPIST

## 2021-11-08 PROCEDURE — 97140 MANUAL THERAPY 1/> REGIONS: CPT | Mod: GP | Performed by: PHYSICAL THERAPIST

## 2021-11-11 ENCOUNTER — HOSPITAL ENCOUNTER (OUTPATIENT)
Dept: PHYSICAL THERAPY | Facility: CLINIC | Age: 41
Setting detail: THERAPIES SERIES
End: 2021-11-11
Attending: NURSE PRACTITIONER
Payer: OTHER MISCELLANEOUS

## 2021-11-11 PROCEDURE — 97530 THERAPEUTIC ACTIVITIES: CPT | Mod: GP | Performed by: PHYSICAL THERAPIST

## 2021-11-11 PROCEDURE — 97110 THERAPEUTIC EXERCISES: CPT | Mod: GP | Performed by: PHYSICAL THERAPIST

## 2021-11-15 ENCOUNTER — HOSPITAL ENCOUNTER (OUTPATIENT)
Dept: PHYSICAL THERAPY | Facility: CLINIC | Age: 41
Setting detail: THERAPIES SERIES
End: 2021-11-15
Attending: NURSE PRACTITIONER
Payer: OTHER MISCELLANEOUS

## 2021-11-15 PROCEDURE — 97110 THERAPEUTIC EXERCISES: CPT | Mod: GP | Performed by: PHYSICAL THERAPIST

## 2021-11-15 PROCEDURE — 97014 ELECTRIC STIMULATION THERAPY: CPT | Mod: GP | Performed by: PHYSICAL THERAPIST

## 2021-11-22 ENCOUNTER — HOSPITAL ENCOUNTER (OUTPATIENT)
Dept: PHYSICAL THERAPY | Facility: CLINIC | Age: 41
Setting detail: THERAPIES SERIES
End: 2021-11-22
Attending: NURSE PRACTITIONER
Payer: OTHER MISCELLANEOUS

## 2021-11-22 PROCEDURE — 97110 THERAPEUTIC EXERCISES: CPT | Mod: GP | Performed by: PHYSICAL THERAPIST

## 2021-11-22 PROCEDURE — 97140 MANUAL THERAPY 1/> REGIONS: CPT | Mod: GP | Performed by: PHYSICAL THERAPIST

## 2021-12-03 ENCOUNTER — HOSPITAL ENCOUNTER (OUTPATIENT)
Dept: PHYSICAL THERAPY | Facility: CLINIC | Age: 41
Setting detail: THERAPIES SERIES
End: 2021-12-03
Attending: NURSE PRACTITIONER
Payer: OTHER MISCELLANEOUS

## 2021-12-03 PROCEDURE — 97140 MANUAL THERAPY 1/> REGIONS: CPT | Mod: GP

## 2021-12-03 PROCEDURE — 97110 THERAPEUTIC EXERCISES: CPT | Mod: GP

## 2021-12-03 NOTE — PROGRESS NOTES
[facility]    Outpatient Physical Therapy Progress Note  Patient: ABDIAS Jon  : 1980    Beginning/End Dates of Reporting Period:  10/18/2021 to 12/3/2021    Referring Provider: Daysi Lanza (Spine clinic), Dr. Cabrales PCP    Therapy Diagnosis: Low back pain, lumbar radiculopathy; reduced ROM, joint mobility, activity tolerance     Client Self Report: Pt. reports he's feeling good today, no pain at this time. Still having some pain on the left side when he over does it with increased activity or prolonged sitting. Reports pain 2/10 at worst over the past week.    Objective Measurements:        Objective Measure: Per patient from MD visit last week: Updated restrictions  Details: No bending, stooping, no prolonged sitting or standing, no lifting >10#. States was off until 2022. Will see PCP again prior.     Objective Measure: Lumbar ROM  Details: Flex: WNL, Ext: WNL, R SB: decreased 10%, mild pain production at L L4-5, L SB: WNL    Objective Measure: Palpation  Details: TTP and increased soft tissue tension at L>R L4-5, L QL     Objective Measure: FREDDY  Details: 12%       Goals:  Goal Identifier FREDDY   Goal Description Patient will reduce back related disabilty from 38% to <10% to return to work without restrictions in 8 weeks   Target Date 21   Date Met      Progress (detail required for progress note): 12%     Goal Identifier Pain    Goal Description Patient will note no radicular LE symptoms in L LE x 1 week to demonstrate centralzation for disc healing.   Target Date 10/27/21   Date Met  10/18/21   Progress (detail required for progress note):       Goal Identifier Activity tolerance   Goal Description Patient will report ability to stand, reposition, cook light meal for 15-20 minutes, walk at grocery store with pain < 3/10 and no leg pain   Target Date 21   Date Met  21    Progress (detail required for progress note): Pain 0-2/10 this week with those activities     Goal Identifier Patient    Goal Description Patient will tolerate 30 minutes of core and LE strengthening, lifting and carrying 25# in basket, squatting and reaching overhead without pain in 6 weeks   Target Date 12/20/21   Date Met      Progress (detail required for progress note): New goal added     Patient has attended 6 visits in this reporting period. Treatment has included therapeutic exercise for Lumbar positioning/centralization, ROM, Manual therapy to address tightness and reduced joint mobility affecting posture and movement tolerance, modalities to reduce inflammation and pain, and patient education. Pt. Demonstrates centralization of sx over the past couple of weeks, no presence of L LE pain/sx. Increased soft tissue tension/muscle tightness persists L>R paraspinals. Overall tolerating progressed exercises well. Will continue to benefit from skilled PT to address work simulated tasks and facilitate improved strength/mobility in order to safely return to pre-injury job.    Plan:  Continue therapy per current plan of care.    Discharge:  No

## 2021-12-06 ENCOUNTER — HOSPITAL ENCOUNTER (OUTPATIENT)
Dept: PHYSICAL THERAPY | Facility: CLINIC | Age: 41
Setting detail: THERAPIES SERIES
End: 2021-12-06
Attending: NURSE PRACTITIONER
Payer: OTHER MISCELLANEOUS

## 2021-12-06 PROCEDURE — 97110 THERAPEUTIC EXERCISES: CPT | Mod: GP | Performed by: PHYSICAL THERAPIST

## 2021-12-06 PROCEDURE — 97140 MANUAL THERAPY 1/> REGIONS: CPT | Mod: GP | Performed by: PHYSICAL THERAPIST

## 2022-02-25 ENCOUNTER — OFFICE VISIT (OUTPATIENT)
Dept: INTERNAL MEDICINE | Facility: CLINIC | Age: 42
End: 2022-02-25
Payer: COMMERCIAL

## 2022-02-25 VITALS
RESPIRATION RATE: 16 BRPM | HEART RATE: 90 BPM | HEIGHT: 66 IN | BODY MASS INDEX: 29.57 KG/M2 | SYSTOLIC BLOOD PRESSURE: 128 MMHG | OXYGEN SATURATION: 99 % | DIASTOLIC BLOOD PRESSURE: 76 MMHG | WEIGHT: 184 LBS | TEMPERATURE: 97.5 F

## 2022-02-25 DIAGNOSIS — K64.8 HEMORRHOIDS, INTERNAL: Primary | ICD-10-CM

## 2022-02-25 DIAGNOSIS — E11.65 TYPE 2 DIABETES MELLITUS WITH HYPERGLYCEMIA, WITH LONG-TERM CURRENT USE OF INSULIN (H): ICD-10-CM

## 2022-02-25 DIAGNOSIS — Z79.4 TYPE 2 DIABETES MELLITUS WITH HYPERGLYCEMIA, WITH LONG-TERM CURRENT USE OF INSULIN (H): ICD-10-CM

## 2022-02-25 LAB
ALBUMIN SERPL-MCNC: 4 G/DL (ref 3.4–5)
ALP SERPL-CCNC: 55 U/L (ref 40–150)
ALT SERPL W P-5'-P-CCNC: 43 U/L (ref 0–70)
ANION GAP SERPL CALCULATED.3IONS-SCNC: 5 MMOL/L (ref 3–14)
AST SERPL W P-5'-P-CCNC: 16 U/L (ref 0–45)
BILIRUB SERPL-MCNC: 0.5 MG/DL (ref 0.2–1.3)
BUN SERPL-MCNC: 15 MG/DL (ref 7–30)
CALCIUM SERPL-MCNC: 9.3 MG/DL (ref 8.5–10.1)
CHLORIDE BLD-SCNC: 101 MMOL/L (ref 94–109)
CO2 SERPL-SCNC: 29 MMOL/L (ref 20–32)
CREAT SERPL-MCNC: 0.87 MG/DL (ref 0.66–1.25)
GFR SERPL CREATININE-BSD FRML MDRD: >90 ML/MIN/1.73M2
GLUCOSE BLD-MCNC: 310 MG/DL (ref 70–99)
HBA1C MFR BLD: 11.6 % (ref 0–5.6)
POTASSIUM BLD-SCNC: 4.8 MMOL/L (ref 3.4–5.3)
PROT SERPL-MCNC: 7.5 G/DL (ref 6.8–8.8)
SODIUM SERPL-SCNC: 135 MMOL/L (ref 133–144)

## 2022-02-25 PROCEDURE — 36415 COLL VENOUS BLD VENIPUNCTURE: CPT | Performed by: INTERNAL MEDICINE

## 2022-02-25 PROCEDURE — 99203 OFFICE O/P NEW LOW 30 MIN: CPT | Performed by: INTERNAL MEDICINE

## 2022-02-25 PROCEDURE — 80053 COMPREHEN METABOLIC PANEL: CPT | Performed by: INTERNAL MEDICINE

## 2022-02-25 PROCEDURE — 83036 HEMOGLOBIN GLYCOSYLATED A1C: CPT | Performed by: INTERNAL MEDICINE

## 2022-02-25 RX ORDER — LISINOPRIL 10 MG/1
1 TABLET ORAL DAILY
COMMUNITY
Start: 2021-10-22

## 2022-02-25 RX ORDER — METFORMIN HCL 500 MG
1000 TABLET, EXTENDED RELEASE 24 HR ORAL
COMMUNITY
Start: 2021-10-20

## 2022-02-25 RX ORDER — INSULIN GLARGINE 100 [IU]/ML
18 INJECTION, SOLUTION SUBCUTANEOUS
COMMUNITY
Start: 2021-09-29

## 2022-02-25 RX ORDER — HYDROCORTISONE ACETATE 25 MG/1
25 SUPPOSITORY RECTAL 2 TIMES DAILY
Qty: 12 SUPPOSITORY | Refills: 3 | Status: SHIPPED | OUTPATIENT
Start: 2022-02-25

## 2022-02-25 ASSESSMENT — PAIN SCALES - GENERAL: PAINLEVEL: NO PAIN (0)

## 2022-02-25 NOTE — PROGRESS NOTES
"      Subjective   Raj is a 41 year old who presents for the following health issues     History of Present Illness     Reason for visit:  Roids  Symptom onset:  3-4 weeks ago  Symptoms include:  I noticed a bulge or lump near my rectum  Symptom intensity:  Mild  Symptom progression:  Staying the same  Had these symptoms before:  No  What makes it worse:  The fact that it's noticeable when i have a movement    He eats 0-1 servings of fruits and vegetables daily.He consumes 1 sweetened beverage(s) daily.He exercises with enough effort to increase his heart rate 20 to 29 minutes per day.  He exercises with enough effort to increase his heart rate 4 days per week.   He is taking medications regularly.     EMR reviewed including:             Complaint, History of Chief Complaint, Corresponding Review of Systems, and Complaint Specific Physical Examination.    #1  \"Lump on backside\"  As internal hemorrhoid.  Mild bleeding.  Spontaneously retracts  No significant inflammation.  Long hemorrhoid on exam  Minimally painful slightly itchy.      #2   Follow-up on type 2 diabetes  On insulin and Metformin  Currently not on statin but is taking ACE inhibitor and occasional aspirin.  Denies any polyuria or polydipsia  Last A1c greater than 10        Exam:   LUNGS: clear bilaterally, airflow is brisk, no intercostal retraction or stridor is noted. No coughing is noted during visit.   HEART:  regular without rubs, clicks, gallops, or murmurs. PMI is nondisplaced. Upstrokes are brisk. S1,S2 are heard.   NEURO: Pt is alert and appropriate. No neurologic lateralization is noted. Cranial nerves 2-12 are intact. Peripheral sensory and motor function are grossly normal.         Patient has been interviewed, applicable history and applied review of systems have been performed.    Vital Signs:   /76   Pulse 90   Temp 97.5  F (36.4  C) (Temporal)   Resp 16   Ht 1.676 m (5' 6\")   Wt 83.5 kg (184 lb)   SpO2 99%   BMI 29.70 kg/m  "       Decision Making    Problem and Complexity     1. Hemorrhoids, internal  We will use hydrocortisone based Anusol suppositories.  Recommend stool softeners and soluble fiber diet.  - hydrocortisone (ANUSOL-HC) 25 MG suppository; Place 1 suppository (25 mg) rectally 2 times daily  Dispense: 12 suppository; Refill: 3    2. Type 2 diabetes mellitus with hyperglycemia, with long-term current use of insulin (H)  Check A1c and renal function.  Continue current medications and adjust accordingly.  - Hemoglobin A1c; Future  - Comprehensive metabolic panel (BMP + Alb, Alk Phos, ALT, AST, Total. Bili, TP); Future  - Hemoglobin A1c  - Comprehensive metabolic panel (BMP + Alb, Alk Phos, ALT, AST, Total. Bili, TP)                                FOLLOW UP   I have asked the patient to make an appointment for followup with me or preferred provider in the next 3 months pending lab results            I have carefully explained the diagnosis and treatment options to the patient.  The patient has displayed an understanding of the above, and all subsequent questions were answered.      DO FLAKO Ambrosio    Portions of this note were produced using [employer]  Although every attempt at real-time proof reading has been made, occasional grammar/syntax errors may have been missed.

## 2022-03-14 NOTE — TELEPHONE ENCOUNTER
Patient requested refill on tizanidine and methylprednisolone. Medication shows discontinued on med list so I was unable to attempt refill. Any questions please contact patient.    Thank you,  Kendra Junior, [facility] [place]

## 2022-05-29 ENCOUNTER — HEALTH MAINTENANCE LETTER (OUTPATIENT)
Age: 42
End: 2022-05-29

## 2022-10-03 ENCOUNTER — HEALTH MAINTENANCE LETTER (OUTPATIENT)
Age: 42
End: 2022-10-03

## 2022-12-26 ENCOUNTER — NURSE TRIAGE (OUTPATIENT)
Dept: NURSING | Facility: CLINIC | Age: 42
End: 2022-12-26

## 2022-12-26 NOTE — TELEPHONE ENCOUNTER
Nurse Triage SBAR    Situation:   Suspected covid    Background:   -Wife calling, It is okay to leave a detailed message at this number.   -No consent to communicate  -Wife was able to put ABDIAS on the phone  -Hx of diabeteis    Assessment:   -Wife tested positive for covid  -He tested negative (a few hours ago)  -his symptons staretr last nigh  -Headache  intermittent fever - last was this am (he dose not know what it was)  -Chills  -Body aches    Recommendation:   -Discuss With PCP And Callback By Nurse Within 1 Hour - PCP is not with [facility], unable to 2LT - FNA RN unable to offer covid antiviral due to not testing positive - FNA offered virtual UC  -Patient states that he will retest and call back if he tests positive (to get antiviral VV) or if his symptoms get worse/change    SELENE FRANCOIS, RN on 12/26/2022 at 4:00 PM        Reason for Disposition    HIGH RISK for severe COVID complications (e.g., weak immune system, age > 64 years, obesity with BMI > 25, pregnant, chronic lung disease or other chronic medical condition) (Exception: Already seen by PCP and no new or worsening symptoms.)    [1] COVID-19 infection suspected by caller or triager AND [2] mild symptoms (cough, fever, or others) AND [3] negative COVID-19 rapid test    Additional Information    Negative: [1] Diagnosed or suspected COVID-19 AND [2] symptoms lasting 3 or more weeks    Negative: [1] COVID-19 exposure AND [2] no symptoms    Negative: COVID-19 vaccine reaction suspected (e.g., fever, headache, muscle aches) occurring 1 to 3 days after getting vaccine    Negative: COVID-19 vaccine, questions about    Negative: [1] Lives with someone known to have influenza (flu test positive) AND [2] flu-like symptoms (e.g., cough, runny nose, sore throat, SOB; with or without fever)    Negative: [1] Adult with possible COVID-19 symptoms AND [2] triager concerned about severity of symptoms or other causes    Negative: COVID-19 and breastfeeding, questions  about    Negative: SEVERE difficulty breathing (e.g., struggling for each breath, speaks in single words)    Negative: Difficult to awaken or acting confused (e.g., disoriented, slurred speech)    Negative: Bluish (or gray) lips or face now    Negative: Shock suspected (e.g., cold/pale/clammy skin, too weak to stand, low BP, rapid pulse)    Negative: Sounds like a life-threatening emergency to the triager    Negative: SEVERE or constant chest pain or pressure  (Exception: Mild central chest pain, present only when coughing.)    Negative: Chest pain or pressure    Negative: Patient sounds very sick or weak to the triager    Negative: MODERATE difficulty breathing (e.g., speaks in phrases, SOB even at rest, pulse 100-120)    Negative: MILD difficulty breathing (e.g., minimal/no SOB at rest, SOB with walking, pulse <100)    Negative: Headache and stiff neck (can't touch chin to chest)    Negative: Oxygen level (e.g., pulse oximetry) 90 percent or lower    Negative: [1] Fever > 101 F (38.3 C) AND [2] over 60 years of age    Negative: [1] Fever > 100.0 F (37.8 C) AND [2] bedridden (e.g., nursing home patient, CVA, chronic illness, recovering from surgery)    Negative: Fever > 103 F (39.4 C)    Protocols used: CORONAVIRUS (COVID-19) DIAGNOSED OR [id number] 1.18.2022

## 2023-02-11 ENCOUNTER — HEALTH MAINTENANCE LETTER (OUTPATIENT)
Age: 43
End: 2023-02-11

## 2023-05-20 ENCOUNTER — HEALTH MAINTENANCE LETTER (OUTPATIENT)
Age: 43
End: 2023-05-20

## 2023-10-11 NOTE — PROGRESS NOTES
DISCHARGE  Reason for Discharge: Patient has failed to schedule further appointments. Attended 12 sessions 9/29/21- 12/6/21. No further follow up at this time    Equipment Issued: HEP    Discharge Plan: HEP    Referring Provider:  Daysi Lanza

## 2023-10-21 ENCOUNTER — HEALTH MAINTENANCE LETTER (OUTPATIENT)
Age: 43
End: 2023-10-21

## 2024-03-09 ENCOUNTER — HEALTH MAINTENANCE LETTER (OUTPATIENT)
Age: 44
End: 2024-03-09

## 2024-07-27 ENCOUNTER — HEALTH MAINTENANCE LETTER (OUTPATIENT)
Age: 44
End: 2024-07-27

## 2024-12-14 ENCOUNTER — HEALTH MAINTENANCE LETTER (OUTPATIENT)
Age: 44
End: 2024-12-14